# Patient Record
Sex: MALE | Race: WHITE | NOT HISPANIC OR LATINO
[De-identification: names, ages, dates, MRNs, and addresses within clinical notes are randomized per-mention and may not be internally consistent; named-entity substitution may affect disease eponyms.]

---

## 2018-04-24 PROBLEM — Z00.00 ENCOUNTER FOR PREVENTIVE HEALTH EXAMINATION: Noted: 2018-04-24

## 2018-05-24 ENCOUNTER — APPOINTMENT (OUTPATIENT)
Dept: DERMATOLOGY | Facility: CLINIC | Age: 18
End: 2018-05-24

## 2019-01-02 ENCOUNTER — APPOINTMENT (OUTPATIENT)
Dept: DERMATOLOGY | Facility: CLINIC | Age: 19
End: 2019-01-02
Payer: COMMERCIAL

## 2019-01-02 VITALS — BODY MASS INDEX: 19.85 KG/M2 | WEIGHT: 163 LBS | HEIGHT: 76 IN

## 2019-01-02 DIAGNOSIS — L70.0 ACNE VULGARIS: ICD-10-CM

## 2019-01-02 DIAGNOSIS — L81.3 CAFE AU LAIT SPOTS: ICD-10-CM

## 2019-01-02 PROCEDURE — 99203 OFFICE O/P NEW LOW 30 MIN: CPT

## 2019-12-23 ENCOUNTER — APPOINTMENT (OUTPATIENT)
Dept: DERMATOLOGY | Facility: CLINIC | Age: 19
End: 2019-12-23
Payer: COMMERCIAL

## 2019-12-23 PROCEDURE — 99213 OFFICE O/P EST LOW 20 MIN: CPT

## 2020-05-13 ENCOUNTER — APPOINTMENT (OUTPATIENT)
Dept: DERMATOLOGY | Facility: CLINIC | Age: 20
End: 2020-05-13
Payer: COMMERCIAL

## 2020-05-13 PROCEDURE — 99213 OFFICE O/P EST LOW 20 MIN: CPT | Mod: 95

## 2020-05-13 NOTE — HISTORY OF PRESENT ILLNESS
[Home] : at home, [unfilled] , at the time of the visit. [Medical Office: ([facility])___] : at the medical office located in  [Patient] : the patient [de-identified] : Patient states present for 5-6 years, tx'ed mostly by other dermatologists prior to moving to this region - tx's included T/Sal shampoo, nizoral shampoo and DHS-Zinc shampoo.  He feels underwhelmed by each of these txs.  There is significant scale and itching diffusely on the scalp.  \par Patient is graduating college this month, no current job prospects. [FreeTextEntry1] : "psoriasis" of the scalp.

## 2020-05-13 NOTE — PHYSICAL EXAM
[Alert] : alert [Well Nourished] : well nourished [Oriented x 3] : ~L oriented x 3 [Nose] : Nose [Eyelids] : Eyelids [Ears] : Ears [Neck] : Neck [Lips] : Lips [FreeTextEntry3] : Unable to reasonably examine the scalp by video link.

## 2020-05-13 NOTE — ASSESSMENT
[FreeTextEntry1] : I had seen the patient in Jan. '19 - dx with seb derm at that time.\par Recommend:\par Neut. T/Sal shampoo daily for 2 weeks.\par Then DHS Tar Gel shampoo daily, and lidex solution bid.\par As improves, recommend use of DHS Zinc shampoo routinely, as it is generally more tolerable than the tar shampoo's.\par \par Call if continue progresses or persists.\par \par \par Risks and benefits of receiving telehealth services has been discussed with the patient (and parent, spouse, or care giver when appropriate), including possible privacy risk associated with the technology.  The patient (parent, spouse or care giver) is aware that this interaction is equivalent  to an office visit, and will have a cost associated with the visit.  The patient has been given ample opportunity to discuss any questions [facility].  All of the patient's (parents, spouse or care giver's) questions have been answered to their satisfaction.\par Verbal consent was obtained for this telehealth visit.  The patient's date of birth and location have been confirmed.\par

## 2020-12-23 ENCOUNTER — APPOINTMENT (OUTPATIENT)
Dept: DERMATOLOGY | Facility: CLINIC | Age: 20
End: 2020-12-23

## 2021-04-10 ENCOUNTER — OUTPATIENT (OUTPATIENT)
Dept: OUTPATIENT SERVICES | Facility: HOSPITAL | Age: 21
LOS: 1 days | End: 2021-04-10
Payer: COMMERCIAL

## 2021-04-10 DIAGNOSIS — Z20.828 CONTACT WITH AND (SUSPECTED) EXPOSURE TO OTHER VIRAL COMMUNICABLE DISEASES: ICD-10-CM

## 2021-04-10 LAB — SARS-COV-2 RNA SPEC QL NAA+PROBE: SIGNIFICANT CHANGE UP

## 2021-04-10 PROCEDURE — U0005: CPT

## 2021-04-10 PROCEDURE — C9803: CPT

## 2021-04-10 PROCEDURE — U0003: CPT

## 2021-04-11 DIAGNOSIS — Z20.828 CONTACT WITH AND (SUSPECTED) EXPOSURE TO OTHER VIRAL COMMUNICABLE DISEASES: ICD-10-CM

## 2021-06-29 ENCOUNTER — APPOINTMENT (OUTPATIENT)
Dept: DERMATOLOGY | Facility: CLINIC | Age: 21
End: 2021-06-29
Payer: COMMERCIAL

## 2021-06-29 DIAGNOSIS — Z12.83 ENCOUNTER FOR SCREENING FOR MALIGNANT NEOPLASM OF SKIN: ICD-10-CM

## 2021-06-29 DIAGNOSIS — L81.4 OTHER MELANIN HYPERPIGMENTATION: ICD-10-CM

## 2021-06-29 DIAGNOSIS — D22.9 MELANOCYTIC NEVI, UNSPECIFIED: ICD-10-CM

## 2021-06-29 DIAGNOSIS — D48.9 NEOPLASM OF UNCERTAIN BEHAVIOR, UNSPECIFIED: ICD-10-CM

## 2021-06-29 PROCEDURE — 99072 ADDL SUPL MATRL&STAF TM PHE: CPT

## 2021-06-29 PROCEDURE — 99213 OFFICE O/P EST LOW 20 MIN: CPT | Mod: 25

## 2021-06-29 PROCEDURE — 11102 TANGNTL BX SKIN SINGLE LES: CPT

## 2021-06-29 NOTE — PHYSICAL EXAM
[FreeTextEntry3] : AAOx3, pleasant, NAD, no visual lymphadenopathy\par hair, scalp, face, nose, eyelids, ears, lips, oropharynx, neck, chest, abdomen, back, right arm, left arm, nails, and hands examined with all normal findings,\par pertinent findings include:\par \par thick yellow plaques on frontal scalp

## 2021-06-29 NOTE — HISTORY OF PRESENT ILLNESS
[FreeTextEntry1] : scalp psoriasis [de-identified] : 21 year old male here for scalp psoriasis. has used various shampoos and lidex solution. no response.

## 2021-06-29 NOTE — ASSESSMENT
[FreeTextEntry1] : seb derm\par -education\par -gentle skin care reviewed\par c/w topicals; SED\par add diflucan 200 mg PO daily x7 days; SED\par consider adding biologic if needed\par \par Shave bx location left frontal scalp\par diagnosis: r/p scalp psoriasis, LSC, tinea on fragments\par  \par Shave biopsy performed today over above location, risks and benefits discussed including incomplete removal, not enough tissue for diagnosis scarring and infection, informed consent obtained, vaseline and bandaid placed, tolerated well, wound care reviewed, specimen sent to pathology.\par \par deeper bx if warranted next visit\par

## 2021-07-06 ENCOUNTER — NON-APPOINTMENT (OUTPATIENT)
Age: 21
End: 2021-07-06

## 2021-07-06 LAB — CORE LAB BIOPSY: NORMAL

## 2021-07-19 PROBLEM — L21.9 SEBORRHEIC DERMATITIS: Status: ACTIVE | Noted: 2019-01-02

## 2021-07-20 ENCOUNTER — APPOINTMENT (OUTPATIENT)
Dept: DERMATOLOGY | Facility: CLINIC | Age: 21
End: 2021-07-20

## 2021-07-20 DIAGNOSIS — L21.9 SEBORRHEIC DERMATITIS, UNSPECIFIED: ICD-10-CM

## 2021-07-21 NOTE — HISTORY OF PRESENT ILLNESS
[FreeTextEntry1] : f/u seb derm [de-identified] : 21 year old male here for seb derm. s/p diflucan.

## 2021-09-07 ENCOUNTER — APPOINTMENT (OUTPATIENT)
Dept: DERMATOLOGY | Facility: CLINIC | Age: 21
End: 2021-09-07

## 2022-06-13 ENCOUNTER — NON-APPOINTMENT (OUTPATIENT)
Age: 22
End: 2022-06-13

## 2024-05-15 ENCOUNTER — NON-APPOINTMENT (OUTPATIENT)
Age: 24
End: 2024-05-15

## 2024-05-16 ENCOUNTER — APPOINTMENT (OUTPATIENT)
Dept: RHEUMATOLOGY | Facility: CLINIC | Age: 24
End: 2024-05-16
Payer: COMMERCIAL

## 2024-05-16 VITALS
BODY MASS INDEX: 21.31 KG/M2 | DIASTOLIC BLOOD PRESSURE: 78 MMHG | SYSTOLIC BLOOD PRESSURE: 110 MMHG | HEART RATE: 64 BPM | OXYGEN SATURATION: 97 % | WEIGHT: 175 LBS | TEMPERATURE: 97.9 F | HEIGHT: 76 IN

## 2024-05-16 DIAGNOSIS — L40.9 PSORIASIS, UNSPECIFIED: ICD-10-CM

## 2024-05-16 DIAGNOSIS — R21 RASH AND OTHER NONSPECIFIC SKIN ERUPTION: ICD-10-CM

## 2024-05-16 DIAGNOSIS — R53.83 OTHER FATIGUE: ICD-10-CM

## 2024-05-16 DIAGNOSIS — M79.622 PAIN IN LEFT UPPER ARM: ICD-10-CM

## 2024-05-16 PROCEDURE — 99204 OFFICE O/P NEW MOD 45 MIN: CPT

## 2024-05-16 RX ORDER — FLUOCINONIDE 0.5 MG/ML
0.05 SOLUTION TOPICAL TWICE DAILY
Qty: 1 | Refills: 2 | Status: DISCONTINUED | COMMUNITY
Start: 2020-05-13 | End: 2024-05-16

## 2024-05-16 RX ORDER — FLUCONAZOLE 200 MG/1
200 TABLET ORAL DAILY
Qty: 7 | Refills: 0 | Status: DISCONTINUED | COMMUNITY
Start: 2021-06-29 | End: 2024-05-16

## 2024-05-16 RX ORDER — CLOBETASOL PROPIONATE 0.5 MG/G
0.05 AEROSOL, FOAM TOPICAL TWICE DAILY
Qty: 1 | Refills: 3 | Status: DISCONTINUED | COMMUNITY
Start: 2021-07-23 | End: 2024-05-16

## 2024-05-16 NOTE — REVIEW OF SYSTEMS
[Feeling Tired] : feeling tired [Skin Lesions] : skin lesion [As Noted in HPI] : as noted in HPI [Negative] : Endocrine

## 2024-05-16 NOTE — PHYSICAL EXAM
[General Appearance - Alert] : alert [General Appearance - In No Acute Distress] : in no acute distress [General Appearance - Well Developed] : well developed [General Appearance - Well-Appearing] : healthy appearing [Sclera] : the sclera and conjunctiva were normal [Extraocular Movements] : extraocular movements were intact [Outer Ear] : the ears and nose were normal in appearance [Neck Appearance] : the appearance of the neck was normal [] : no respiratory distress [Exaggerated Use Of Accessory Muscles For Inspiration] : no accessory muscle use [Edema] : there was no peripheral edema [Musculoskeletal - Swelling] : no joint swelling seen [FreeTextEntry1] : erythematous rash noted on R axilla; no significant erythema on L. axilla but mild tenderness in L. axilla [No Focal Deficits] : no focal deficits [Oriented To Time, Place, And Person] : oriented to person, place, and time [Affect] : the affect was normal [Mood] : the mood was normal

## 2024-05-16 NOTE — ASSESSMENT
[FreeTextEntry1] : 24M with  psoriasis (previously on many treatments including Skyrizi [id number], most recently Bimzelx (IL-17 inhibitor) from 2023-2/2024, discontinued as he felt it did not help much) here for evaluation of fatigue for the past 3 weeks.  Notably recently MADYSON, inflammatory markers, TSH, CBC, SPEP all normal in 4/29/24 (labs to be scanned in).  Patient denies joint pains or nail changes- so there is no concern for psoriatic arthritis at this time. Unclear what is causing fatigue for the ast 3 weeks- infection should be ruled out- will test for EBV, hepatitis, HIV, will also check for parvovirus although this is less likely as pt denies joint pain or exposure to day care age children.   I have also ordered US of L. axilla for further evaluation of L. axilla tenderness and reported possible enlarged lymph node. I have also advised him to follow up with dermatologist regarding rash on R. axilla, erythematous, allergic response should be ruled out as well.

## 2024-05-16 NOTE — HISTORY OF PRESENT ILLNESS
[FreeTextEntry1] : 24M with  psoriasis (previously on many treatments including Skyrizi [id number], most recently Bimzelx (IL-17 inhibitor) from 2023-2/2024, discontinued as he felt it did not help much) here for evaluation of fatigue for the past 3 weeks. Denies antecedent illness or major stressor; No recent sick contacts; he himself denied viral URI symptoms. Denies unintentional weight loss. No joint pain or joint swelling. No nail pitting/ridging. No oral or nasal ulcers, no hematuria, no Raynauds, no history of thrombosis.  Of note he also had rash on b/l underarms, which he feels is unrelated to the psoriasis; with some increased tenderness to L. axilla as well. Denies receiving recent vaccines.  No change in deodorant or detergent use.  Recently tested negative for lyme.    Reported family hx of autoimmune disease in grandparents but unsure what.  [Weight Loss] : no weight loss [Fatigue] : fatigue [Malar Facial Rash] : no malar facial rash [Skin Lesions] : skin lesions [Skin Nodules] : no skin nodules [Oral Ulcers] : no oral ulcers [Dry Mouth] : no dry mouth [Arthralgias] : no arthralgias [Joint Swelling] : no joint swelling [Joint Warmth] : no joint warmth [Morning Stiffness] : no morning stiffness [Dry Eyes] : no dry eyes

## 2024-05-21 LAB
ANCA AB SER-IMP: NEGATIVE
APPEARANCE: CLEAR
B19V IGG SER QL IA: 0.27 INDEX
B19V IGG+IGM SER-IMP: NEGATIVE
B19V IGG+IGM SER-IMP: NORMAL
B19V IGM FLD-ACNC: 0.13 INDEX
B19V IGM SER-ACNC: NEGATIVE
BILIRUBIN URINE: NEGATIVE
BLOOD URINE: NEGATIVE
C-ANCA SER-ACNC: NEGATIVE
C3 SERPL-MCNC: 109 MG/DL
C4 SERPL-MCNC: 17 MG/DL
COLOR: YELLOW
CREAT SPEC-SCNC: 70 MG/DL
CREAT/PROT UR: 0.1 RATIO
DSDNA AB SER-ACNC: <1 IU/ML
ENA RNP AB SER IA-ACNC: <0.2 AL
ENA SM AB SER IA-ACNC: <0.2 AL
ENA SS-A AB SER IA-ACNC: <0.2 AL
ENA SS-B AB SER IA-ACNC: <0.2 AL
ERYTHROCYTE [SEDIMENTATION RATE] IN BLOOD BY WESTERGREN METHOD: 2 MM/HR
ESTIMATED AVERAGE GLUCOSE: 97 MG/DL
GLUCOSE QUALITATIVE U: NEGATIVE MG/DL
HAV IGM SER QL: NONREACTIVE
HBA1C MFR BLD HPLC: 5 %
HBV CORE IGM SER QL: NONREACTIVE
HBV SURFACE AG SER QL: NONREACTIVE
HCV AB SER QL: NONREACTIVE
HCV S/CO RATIO: 0.09 S/CO
HIV1+2 AB SPEC QL IA.RAPID: NONREACTIVE
KETONES URINE: NEGATIVE MG/DL
LEUKOCYTE ESTERASE URINE: NEGATIVE
M TB IFN-G BLD-IMP: NEGATIVE
NITRITE URINE: NEGATIVE
P-ANCA TITR SER IF: NEGATIVE
PH URINE: 6.5
PROT UR-MCNC: 7 MG/DL
PROTEIN URINE: NEGATIVE MG/DL
QUANTIFERON TB PLUS MITOGEN MINUS NIL: 8.49 IU/ML
QUANTIFERON TB PLUS NIL: 0.02 IU/ML
QUANTIFERON TB PLUS TB1 MINUS NIL: 0 IU/ML
QUANTIFERON TB PLUS TB2 MINUS NIL: 0 IU/ML
SPECIFIC GRAVITY URINE: 1.01
UROBILINOGEN URINE: 0.2 MG/DL

## 2024-06-04 ENCOUNTER — NON-APPOINTMENT (OUTPATIENT)
Age: 24
End: 2024-06-04

## 2024-06-04 ENCOUNTER — APPOINTMENT (OUTPATIENT)
Dept: THORACIC SURGERY | Facility: CLINIC | Age: 24
End: 2024-06-04
Payer: COMMERCIAL

## 2024-06-04 VITALS
OXYGEN SATURATION: 97 % | SYSTOLIC BLOOD PRESSURE: 124 MMHG | BODY MASS INDEX: 21.31 KG/M2 | RESPIRATION RATE: 17 BRPM | HEART RATE: 71 BPM | DIASTOLIC BLOOD PRESSURE: 77 MMHG | WEIGHT: 175 LBS | HEIGHT: 76 IN

## 2024-06-04 DIAGNOSIS — R59.0 LOCALIZED ENLARGED LYMPH NODES: ICD-10-CM

## 2024-06-04 PROCEDURE — 99244 OFF/OP CNSLTJ NEW/EST MOD 40: CPT

## 2024-06-04 PROCEDURE — 99204 OFFICE O/P NEW MOD 45 MIN: CPT

## 2024-06-04 RX ORDER — DOXYCLYCLINE HYCLATE 150 MG/1
TABLET, COATED ORAL
Refills: 0 | Status: ACTIVE | COMMUNITY

## 2024-06-04 NOTE — HISTORY OF PRESENT ILLNESS
[FreeTextEntry1] : Mr. DIEGO TRAYLOR, 24 year old male, never a smoker, w/ hx of Psoriasis (previously on many treatments including Skyrizi [id number], most recently Bimzelx (IL-17 inhibitor) from 2023-2/2024, discontinued as he felt it did not help much)   Self referred   Notably recently MADYSON, inflammatory markers, TSH, CBC, SPEP all normal in 4/29/24  MR Chest without contrast on 5/20/24:  - 1.8 x 0.8 x 3.5 cm T2 hyperintense fluid signal focus in the subcarinal region.  CT Chest on 5/28/24 ([facility])  - Minimal biapical scarring is seen. Central tracheobronchial tree appears patent - Small amount of soft tissue density identified in the anterior mediastinum, best seen (series 3 image 40). This is most consistent with residual thymic tissue. This appears unchanged. No enlarged mediastinal, hilar, or axillary lymph nodes are seen. The previously described subcarinal mediastinal cystic lesion is not appreciated on this non contrast chest CT.  Of note: Recently diagnosed with Lyme disease - Rash to anterior chest wall, fatigue, bilateral eye stye. Was placed on Doxycycline  Patient presents today for CTSX consultation. Reports above symptoms resolving with course of Doxycycline. No fevers, chills, hemoptysis or unintentional weight loss.

## 2024-06-04 NOTE — DATA REVIEWED
[FreeTextEntry1] : Independently reviewed the following: - MR Chest without contrast on 5/20/24 - CT Chest on 5/28/24

## 2024-06-04 NOTE — PHYSICAL EXAM
[Fully active, able to carry on all pre-disease performance without restriction] : Status 0 - Fully active, able to carry on all pre-disease performance without restriction [General Appearance - Alert] : alert [General Appearance - In No Acute Distress] : in no acute distress [General Appearance - Well Nourished] : well nourished [Sclera] : the sclera and conjunctiva were normal [PERRL With Normal Accommodation] : pupils were equal in size, round, and reactive to light [Outer Ear] : the ears and nose were normal in appearance [Both Tympanic Membranes Were Examined] : both tympanic membranes were normal [Neck Appearance] : the appearance of the neck was normal [Neck Cervical Mass (___cm)] : no neck mass was observed [Respiration, Rhythm And Depth] : normal respiratory rhythm and effort [Exaggerated Use Of Accessory Muscles For Inspiration] : no accessory muscle use [Auscultation Breath Sounds / Voice Sounds] : lungs were clear to auscultation bilaterally [Heart Rate And Rhythm] : heart rate was normal and rhythm regular [Examination Of The Chest] : the chest was normal in appearance [Chest Visual Inspection Thoracic Asymmetry] : no chest asymmetry [Diminished Respiratory Excursion] : normal chest expansion [2+] : left 2+ [Breast Appearance] : normal in appearance [Breast Palpation Mass] : no palpable masses [Bowel Sounds] : normal bowel sounds [Abdomen Soft] : soft [Cervical Lymph Nodes Enlarged Posterior Bilaterally] : posterior cervical [Cervical Lymph Nodes Enlarged Anterior Bilaterally] : anterior cervical [Supraclavicular Lymph Nodes Enlarged Bilaterally] : supraclavicular [No CVA Tenderness] : no ~M costovertebral angle tenderness [No Spinal Tenderness] : no spinal tenderness [Involuntary Movements] : no involuntary movements were seen [Skin Color & Pigmentation] : normal skin color and pigmentation [Skin Turgor] : normal skin turgor [] : no rash [No Focal Deficits] : no focal deficits [Oriented To Time, Place, And Person] : oriented to person, place, and time [FreeTextEntry1] : Deferred

## 2024-06-04 NOTE — ASSESSMENT
[FreeTextEntry1] : Mr. DIEGO TRAYLOR, 24 year old male, never a smoker, w/ hx of Psoriasis (previously on many treatments including Skyrizi [id number], most recently Bimzelx (IL-17 inhibitor) from 2023-2/2024, discontinued as he felt it did not help much)   Self referred   Notably recently MADYSON, inflammatory markers, TSH, CBC, SPEP all normal in 4/29/24  MR Chest without contrast on 5/20/24:  - 1.8 x 0.8 x 3.5 cm T2 hyperintense fluid signal focus in the subcarinal region.  CT Chest on 5/28/24 ([facility])  - Minimal biapical scarring is seen. Central tracheobronchial tree appears patent - Small amount of soft tissue density identified in the anterior mediastinum, best seen (series 3 image 40). This is most consistent with residual thymic tissue. This appears unchanged. No enlarged mediastinal, hilar, or axillary lymph nodes are seen. The previously described subcarinal mediastinal cystic lesion is not appreciated on this non contrast chest CT.  Of note: Recently diagnosed with Lyme disease - Rash to anterior chest wall, fatigue, bilateral eye stye. Was placed on Doxycycline  I have independently reviewed the medical records and imaging at the time of this office consultation, and discussed the following interpretations with the patient: - CT imaging reviewed. No evidence of mediastinal lymphadenopathy or cystic lesions. No need for further Thoracic Surgery follow up. Instructed to return to clinic as needed.   Recommendations reviewed with patient during this office visit, and all questions answered; Patient instructed on the importance of follow up and verbalizes understanding.  I, Dr. MANCERA [facility], personally performed the evaluation and management (E/M) services for this new patient. That E/M includes conducting the initial examination, assessing all conditions, and establishing the plan of care. Today, My ACP, Earnestine West, was here to observe my evaluation and management services for this patient to be followed going forward.

## 2024-07-25 ENCOUNTER — APPOINTMENT (OUTPATIENT)
Dept: DERMATOLOGY | Facility: CLINIC | Age: 24
End: 2024-07-25
Payer: COMMERCIAL

## 2024-07-25 PROCEDURE — 99204 OFFICE O/P NEW MOD 45 MIN: CPT

## 2024-08-05 ENCOUNTER — APPOINTMENT (OUTPATIENT)
Dept: RHEUMATOLOGY | Facility: CLINIC | Age: 24
End: 2024-08-05

## 2024-08-06 ENCOUNTER — NON-APPOINTMENT (OUTPATIENT)
Age: 24
End: 2024-08-06

## 2024-08-07 ENCOUNTER — APPOINTMENT (OUTPATIENT)
Dept: RHEUMATOLOGY | Facility: CLINIC | Age: 24
End: 2024-08-07

## 2024-08-07 PROCEDURE — G2211 COMPLEX E/M VISIT ADD ON: CPT | Mod: NC

## 2024-08-07 PROCEDURE — 99214 OFFICE O/P EST MOD 30 MIN: CPT

## 2024-08-09 NOTE — PHYSICAL EXAM
[General Appearance - Alert] : alert [General Appearance - In No Acute Distress] : in no acute distress [General Appearance - Well Developed] : well developed [General Appearance - Well-Appearing] : healthy appearing [Sclera] : the sclera and conjunctiva were normal [Extraocular Movements] : extraocular movements were intact [Outer Ear] : the ears and nose were normal in appearance [Neck Appearance] : the appearance of the neck was normal [] : no respiratory distress [Exaggerated Use Of Accessory Muscles For Inspiration] : no accessory muscle use [Edema] : there was no peripheral edema [Musculoskeletal - Swelling] : no joint swelling seen [FreeTextEntry1] : mild erythematous rashes noted in b/l axilla [No Focal Deficits] : no focal deficits [Oriented To Time, Place, And Person] : oriented to person, place, and time [Affect] : the affect was normal [Mood] : the mood was normal

## 2024-08-09 NOTE — HISTORY OF PRESENT ILLNESS
[___ Month(s) Ago] : [unfilled] month(s) ago [FreeTextEntry1] : 8/2024 followup: Patient with ongoing fatigue.  Rashes on bilateral axilla improved but remain. He states he does not snore. He does not get restorative sleep.  He does not feel achy everywhere. Autoimmune workup from prior visit was unremarkable, as well as hepatitis, HIV, parvovirus testing.

## 2024-08-09 NOTE — REVIEW OF SYSTEMS
[Feeling Tired] : feeling tired [As Noted in HPI] : as noted in HPI [Skin Lesions] : skin lesion [Negative] : Endocrine

## 2024-08-09 NOTE — ASSESSMENT
[FreeTextEntry1] : 24M with psoriasis (previously on many treatments including Skyrizi [id number], most recently Bimzelx (IL-17 inhibitor) from 2023-2/2024, discontinued as he felt it did not help much) here for followup of ongoing fatigue.   Notably recently MADYSON, lyme, inflammatory markers, TSH, CBC, SPEP all normal in 4/29/2. Additional labs, including ESR and test for lupus and Sjogren's were negative as well. Patient denies joint pains or nail changes- so there is no concern for psoriatic arthritis at this time. Unclear what is causing fatigue- he denies snoring and weight is within normal range so sleep apnea less likely;  Patient inquired if post-COVID inflammatory syndrome could be the explanation and I said it may; similarly he could have fibromyalgia, although he does not have the typical widespread tenderness that patients with fibromyalgia have.  He could consider duloxetine daily-patient was given information about this medication and will think about it.  I have also advised him to follow up with dermatologist regarding rash on axillae.

## 2024-08-16 ENCOUNTER — APPOINTMENT (OUTPATIENT)
Dept: DERMATOLOGY | Facility: CLINIC | Age: 24
End: 2024-08-16
Payer: COMMERCIAL

## 2024-08-16 PROCEDURE — 99213 OFFICE O/P EST LOW 20 MIN: CPT
